# Patient Record
Sex: FEMALE | Race: WHITE
[De-identification: names, ages, dates, MRNs, and addresses within clinical notes are randomized per-mention and may not be internally consistent; named-entity substitution may affect disease eponyms.]

---

## 2017-02-01 NOTE — ED
Oralia ABDI Rebecca, scribed for Adam Wright MD on 17 at 0254 .





HPI Chest Pain





- HPI Summary


HPI Summary: 


Pt is a 37 y/o F who presents to ED c/o CP. Moody began suddenly 2 weeks ago and 

has been intermittent since onset, worsening today. Pain is midsternal without 

radiation, characterized as tightness and ranked 5/10. Sx aggravated and 

alleviated by nothing. Additionally c/o SOB, nonproductive cough, blurry vision 

and facial tingling. Has not been evaluated by her PCP for sx. 








- History of Current Complaint


Chief Complaint: EDGeneral


Hx Obtained From: Patient


Onset/Duration: Started Weeks Ago - 2 weeks ago, Still Present


Timing: Intermittent


Initial Severity: Moderate


Current Severity: Moderate


Pain Intensity: 5


Pain Scale Used: 0-10 Numeric


Chest Pain Location: Mid Sternal


Chest Pain Radiates: No


Character: Tightness


Aggravating Factor(s): Nothing


Alleviating Factor(s): Nothing


Associated Signs and Symptoms: Positive: Vision Changes - blurry, Tingling - 

facial, Shortness of Breath, Nonproductive Cough





- Allergy/Home Medications


Allergies/Adverse Reactions: 


 Allergies











Allergy/AdvReac Type Severity Reaction Status Date / Time


 


Amoxicillin Allergy Severe Anaphylatic Verified 16 13:07





   Shock  


 


Penicillins Allergy Severe Anaphylatic Verified 16 13:07





   Shock  














PMH/Surg Hx/FS Hx/Imm Hx


Endocrine/Hematology History: Reports: Hx Diabetes - TYPE 2


   Denies: Hx Thyroid Disease


Cardiovascular History: Reports: Hx Hypertension


Respiratory History: 


   Denies: Hx Asthma, Hx Chronic Obstructive Pulmonary Disease (COPD)


GI History: 


   Denies: Hx Ulcer





- Surgical History


Surgery Procedure, Year, and Place: .  TUBAL LIGATION


Infectious Disease History: No


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

, Hx Shingles


   Denies: Hx Clostridium Difficile, Hx Hepatitis, Hx Human Immunodeficiency 

Virus (HIV), Hx Tuberculosis, Hx Known/Suspected VRE, Hx Known/Suspected VRSA, 

History Other Infectious Disease, Traveled Outside the [place] in Last 30 Days





- Family History


Known Family History: Positive: Hypertension, Diabetes


   Negative: Blood Disorder





- Social History


Alcohol Use: Occasionally


Hx Substance Use: No


Substance Use Type: Reports: None


Substance Use Comment - Amount & Last Used: last used last night


Hx Tobacco Use: Yes


Smoking Status (MU): Light Every Day Tobacco Smoker


Type: Cigarettes


Amount Used/How Often: 5 CIG/DAY


Length of Time of Smoking/Using Tobacco: 20 years


Have You Smoked in the Last Year: Yes





Review of Systems


Positive: Blurred Vision


Positive: Chest Pain - midsternal


Positive: Shortness Of Breath, Cough - nonproductive


Neurological: Other - Facial tingling


All Other Systems Reviewed And Are Negative: Yes





Physical Exam


Triage Information Reviewed: Yes


Vital Signs On Initial Exam: 


 Initial Vitals











Temp Pulse Resp BP Pulse Ox


 


 97.2 F   90   16   176/93   100 


 


 17 02:30  17 02:30  17 02:30  17 02:30  17 02:30











Vital Signs Reviewed: Yes


Appearance: Positive: No Pain Distress, Obese


Skin: Positive: Warm


Eyes: Positive: FLORESITA


ENT: Positive: Hearing grossly normal


Neck: Positive: Supple


Respiratory/Lung Sounds: Positive: Clear to Auscultation, Breath Sounds Present


Cardiovascular: Positive: Normal


Abdomen Description: Positive: Nontender, No Organomegaly, Soft


Bowel Sounds: Positive: Present


Musculoskeletal: Positive: Strength/ROM Intact


Neurological: Positive: Sensory/Motor Intact


Psychiatric: Positive: Affect/Mood Appropriate





Diagnostics





- Vital Signs


 Vital Signs











  Temp Pulse Resp BP Pulse Ox


 


 17 02:48   93  15   100


 


 17 02:46     158/94 


 


 17 02:30  97.2 F  90  16  176/93  100














- Laboratory


Lab Results: 


 Lab Results











  17 Range/Units





  03:00 03:00 03:00 


 


WBC  10.8    (3.5-10.8)  10^3/ul


 


RBC  5.22    (4.0-5.4)  10^6/ul


 


Hgb  14.4    (12.0-16.0)  g/dl


 


Hct  44    (35-47)  %


 


MCV  84    (80-97)  fL


 


MCH  28    (27-31)  pg


 


MCHC  33    (31-36)  g/dl


 


RDW  15    (10.5-15)  %


 


Plt Count  234    (150-450)  10^3/ul


 


MPV  8    (7.4-10.4)  um3


 


Neut % (Auto)  60.2    (38-83)  %


 


Lymph % (Auto)  33.9    (25-47)  %


 


Mono % (Auto)  4.6    (1-9)  %


 


Eos % (Auto)  1.1    (0-6)  %


 


Baso % (Auto)  0.2    (0-2)  %


 


Absolute Neuts (auto)  6.5    (1.5-7.7)  10^3/ul


 


Absolute Lymphs (auto)  3.7    (1.0-4.8)  10^3/ul


 


Absolute Monos (auto)  0.5    (0-0.8)  10^3/ul


 


Absolute Eos (auto)  0.1    (0-0.6)  10^3/ul


 


Absolute Basos (auto)  0    (0-0.2)  10^3/ul


 


Absolute Nucleated RBC  0    10^3/ul


 


Nucleated RBC %  0    


 


Sodium   129 L   (133-145)  mmol/L


 


Potassium   TNP   


 


Chloride   99 L   (101-111)  mmol/L


 


Carbon Dioxide   22   (22-32)  mmol/L


 


Anion Gap   TNP   


 


BUN   12   (6-24)  mg/dL


 


Creatinine   0.90   (0.51-0.95)  mg/dL


 


Est GFR ( Amer)   91.6   (>60)  


 


Est GFR (Non-Af Amer)   71.3   (>60)  


 


BUN/Creatinine Ratio   13.3   (8-20)  


 


Glucose   349 H   ()  mg/dL


 


Lactic Acid    2.3 H*  (0.5-2.0)  mmol/L


 


Calcium   9.5   (8.6-10.3)  mg/dL


 


Magnesium   TNP   


 


Total Bilirubin   0.40   (0.2-1.0)  mg/dL


 


AST   TNP   


 


ALT   14   (7-52)  U/L


 


Alkaline Phosphatase   68   ()  U/L


 


Troponin I   0.02   (<0.04)  ng/mL


 


Total Protein   7.5   (6.4-8.9)  g/dL


 


Albumin   4.1   (3.2-5.2)  g/dL


 


Globulin   3.4   (2-4)  g/dL


 


Albumin/Globulin Ratio   1.2   (1-3)  


 


TSH   7.78 H   (0.34-5.60)  mcIU/mL


 


Influenza A (Rapid)     (Negative)  


 


Influenza B (Rapid)     (Negative)  














  17 Range/Units





  03:30 


 


WBC   (3.5-10.8)  10^3/ul


 


RBC   (4.0-5.4)  10^6/ul


 


Hgb   (12.0-16.0)  g/dl


 


Hct   (35-47)  %


 


MCV   (80-97)  fL


 


MCH   (27-31)  pg


 


MCHC   (31-36)  g/dl


 


RDW   (10.5-15)  %


 


Plt Count   (150-450)  10^3/ul


 


MPV   (7.4-10.4)  um3


 


Neut % (Auto)   (38-83)  %


 


Lymph % (Auto)   (25-47)  %


 


Mono % (Auto)   (1-9)  %


 


Eos % (Auto)   (0-6)  %


 


Baso % (Auto)   (0-2)  %


 


Absolute Neuts (auto)   (1.5-7.7)  10^3/ul


 


Absolute Lymphs (auto)   (1.0-4.8)  10^3/ul


 


Absolute Monos (auto)   (0-0.8)  10^3/ul


 


Absolute Eos (auto)   (0-0.6)  10^3/ul


 


Absolute Basos (auto)   (0-0.2)  10^3/ul


 


Absolute Nucleated RBC   10^3/ul


 


Nucleated RBC %   


 


Sodium   (133-145)  mmol/L


 


Potassium   


 


Chloride   (101-111)  mmol/L


 


Carbon Dioxide   (22-32)  mmol/L


 


Anion Gap   


 


BUN   (6-24)  mg/dL


 


Creatinine   (0.51-0.95)  mg/dL


 


Est GFR ( Amer)   (>60)  


 


Est GFR (Non-Af Amer)   (>60)  


 


BUN/Creatinine Ratio   (8-20)  


 


Glucose   ()  mg/dL


 


Lactic Acid   (0.5-2.0)  mmol/L


 


Calcium   (8.6-10.3)  mg/dL


 


Magnesium   


 


Total Bilirubin   (0.2-1.0)  mg/dL


 


AST   


 


ALT   (7-52)  U/L


 


Alkaline Phosphatase   ()  U/L


 


Troponin I   (<0.04)  ng/mL


 


Total Protein   (6.4-8.9)  g/dL


 


Albumin   (3.2-5.2)  g/dL


 


Globulin   (2-4)  g/dL


 


Albumin/Globulin Ratio   (1-3)  


 


TSH   (0.34-5.60)  mcIU/mL


 


Influenza A (Rapid)  Negative  (Negative)  


 


Influenza B (Rapid)  Negative  (Negative)  











Result Diagrams: 


 17 03:00





 17 04:15


Lab Statement: Any lab studies that have been ordered have been reviewed, and 

results considered in the medical decision making process.





- EKG


  ** 0234


Cardiac Rate: NL - 91 bpm


EKG Rhythm: Sinus Rhythm


EKG Interpretation: RBBB, no acute ischemia





Re-Evaluation





- Re-Evaluation


  ** First Eval


Re-Evaluation Time: 04:28


Change: Improved


Comment: Pt is feeling significantly better, will be d/c to home. She 

understands and agrees to plan.





Chest Pain Course/Dx





- Course


Assessment/Plan: Pt is a 37 y/o F who presents to ED with a CC of midsternal CP 

intermittently for the past 2 weeks. Additionally c/o blurred vision, SOB, 

facial tingling and nonproductive cough. Pt will be d/c to home with a dx of 

viral syndrome.





- Diagnoses


Provider Diagnoses: 


 Viral syndrome








Discharge





- Discharge Plan


Condition: Stable


Disposition: HOME


Patient Education Materials:  Viral Syndrome (ED)


Referrals: 


John MCKEON,Franklin NICHOLS [Primary Care Provider] - 3 Days (Follow up with your 

primary care physician in 3 days. )





The documentation as recorded by the Oralia adams Rebecca accurately 

reflects the service I personally performed and the decisions made by me, 

Adam Wright MD.

## 2017-08-15 NOTE — UC
Alicia ABDI SooYoung, scribed for Sandip Mejía MD on 08/15/17 at 1956 .





Abdominal Pain Female HPI





- HPI Summary


HPI Summary: 


[place] 36 y/o F [place] [facility] [place] stomach [place] for past 5 days. 

Associated sx: fever maxT of 102 F, chills, n/v/d, malaise, sinus pain onset 1 

day. Denies: melena. She states she's been having sulfur smelling burps. She 

has some Zofran at home which helped but relief didn't last very long. No one 

else at home is sick. Denies PMHx: colitis. Denies prev abd surgeries other 

than . Contacted her PCP, but wasn't able to see her. 














- History of Current Complaint


Chief Complaint: UCGeneralIllness


Stated Complaint: STOMACH ACHE


Time Seen by Provider: 08/15/17 19:53


Hx Obtained From: Patient


Hx Last Menstrual Period: 17


Onset/Duration: Gradual Onset, Lasting Days - onset 5 days ago, Still Present


Timing: Constant


Severity Currently: Moderate


Pain Intensity: 5


Pain Scale Used: 0-10 Numeric


Location: Other - umbilical


Character: Aching, Dull, Sharp


Alleviating Factor(s): Medications - Zofran


Associated Signs and Symptoms: Positive: Fever, Nausea, Vomiting, Diarrhea, 

Other: - pos: chills, malaise.  Negative: Blood in Stool


Allergies/Adverse Reactions: 


 Allergies











Allergy/AdvReac Type Severity Reaction Status Date / Time


 


Amoxicillin Allergy Severe Anaphylatic Verified 16 13:07





   Shock  


 


Penicillins Allergy Severe Anaphylatic Verified 16 13:07





   Shock  














PMH/Surg Hx/FS Hx/Imm Hx


Previously Healthy: No


Endocrine History: Diabetes


Cardiovascular History: Hypertension





- Surgical History


Surgical History: Yes


Surgery Procedure, Year, and Place: .  TUBAL LIGATION





- Family History


Known Family History: Positive: Hypertension, Diabetes


   Negative: Blood Disorder





- Social History


Occupation: Employed Full-time


Lives: With Family


Alcohol Use: Occasionally


Alcohol Amount: one can beer every other day


Substance Use Type: None


Substance Use Comment - Amount & Last Used: last used last night


Smoking Status (MU): Light Every Day Tobacco Smoker


Type: Cigarettes


Amount Used/How Often: 5 CIG/DAY


Length of Time of Smoking/Using Tobacco: 20 years


Have You Smoked in the Last Year: Yes


Household Exposure Type: Cigarettes





- Immunization History


Most Recent Influenza Vaccination: 


Most Recent Tetanus Shot: UTD


Most Recent Pneumonia Vaccination: none





Review of Systems


Constitutional: Fever, Chills, Fatigue - malaise


Skin: Negative


Eyes: Negative


ENT: Negative


Respiratory: Negative


Cardiovascular: Negative


Gastrointestinal: Abdominal Pain, Vomiting, Diarrhea, Nausea, Other - neg: 

melena


Genitourinary: Negative


Motor: Negative


Neurovascular: Negative


Musculoskeletal: Negative


Neurological: Negative


Psychological: Negative


All Other Systems Reviewed And Are Negative: Yes





Physical Exam


Triage Information Reviewed: Yes


Appearance: No Pain Distress, Ill-Appearing - mildly


Vital Signs: 


 Initial Vital Signs











Temp  97.5 F   08/15/17 18:48


 


Pulse  93   08/15/17 18:48


 


Resp  17   08/15/17 18:48


 


BP  141/87   08/15/17 18:48


 


Pulse Ox  100   08/15/17 18:48











Vital Signs Reviewed: Yes


Eyes: Positive: Other: - EOMI/FLORESITA


ENT: Positive: Hearing grossly normal


Neck: Positive: Supple, Nontender


Respiratory: Positive: Chest non-tender, Lungs clear, Normal breath sounds


Cardiovascular: Positive: RRR


Abdomen Description: Positive: Soft, Other: - minimal epigastric tenderness


Bowel Sounds: Positive: Present


Musculoskeletal: Positive: Strength Intact, ROM Intact


Neurological Exam: Normal - sensory/motor intact


Neurological: Positive: Alert - A&Ox3


Psychological: Positive: Age Appropriate Behavior


Skin Exam: Normal - warm, dry, color reflects adequate perfusion





Abd Pain Female Course/Dx





- Course


Course Of Treatment: Pt medications reviewed this visit.  Elevated BP but has 

current hypertension diagnosis.  DUE TO FEVER AND DURATION OF SX, WILL TREAT 

WITH CIPRO 500MG PO BID X 5 DAYS.  F/U PMD.  PATIENT WILL GO TO ED IF WORSE.





- Differential Dx/Diagnosis


Provider Diagnoses: DEHYDRATION. VOMITING AND DIARRHEA WITH FEVER X 5 DAYS.





Discharge





- Discharge Plan


Condition: Stable


Disposition: HOME


Prescriptions: 


Ciprofloxacin TAB* [Cipro 500 MG TAB*] 500 mg PO BID #10 tab


Ondansetron ODT TAB* [Zofran 4 MG Odt TAB*] 4 mg PO Q6H PRN #10 tab.odt


 PRN Reason: Nausea


Patient Education Materials:  Acute Nausea and Vomiting (ED), Acute Diarrhea (ED

), Fever in Adults (ED)


Forms:  *Work Release


Referrals: 


John MCKEON,Franklin NICHOLS [Primary Care Provider] - 


Additional Instructions: 


FOLLOW UP WITH YOUR DOCTOR.


GO TO THE EMERGENCY DEPARTMENT FOR ANY WORSENING OF YOUR CONDITION; PAIN, FEVER

, DEHYDRATION OR QUESTIONS OR CONCERNS.





The documentation as recorded by the Alicia adams SooYoung accurately 

reflects the service I personally performed and the decisions made by me, 

Sandip Mejía MD.

## 2017-08-19 NOTE — UC
Abdominal Pain Female HPI





- HPI Summary


HPI Summary: 





Nausea with loss of appetite starting 10 days ago, vomting multiple times per 

day starting 7 days ago. All recent vomiting has been bilious, denies blood in 

vomit or stool. No melena. Reports fever and chills without clear measurement 

of time or temperature. Seen here about 5 days ago, rx with cipro and zofran. 

Went to [facility] ED 2 days ago because of uncontrolled vomiting, contrast 

CT and abd US were negative. Returns here today because sx feel no better. 

Marked epigastric pain with bilious vomiting x 2 today.





- History of Current Complaint


Chief Complaint: UCGI


Stated Complaint: ABD PAIN


Time Seen by Provider: 17 12:40


Hx Obtained From: Patient


Hx Last Menstrual Period: 2717


Pregnant?: No


Onset/Duration: Gradual Onset, Lasting Weeks


Timing: Constant


Severity Initially: Mild


Severity Currently: Moderate


Location: Epigastric


Radiates: No


Character: Aching, Burning, Cramping, Sharp


Aggravating Factor(s): Food, Movement


Alleviating Factor(s): Nothing


Associated Signs and Symptoms: Positive: Fever, Nausea, Vomiting.  Negative: 

Blood in Stool, Urinary Symptoms


Allergies/Adverse Reactions: 


 Allergies











Allergy/AdvReac Type Severity Reaction Status Date / Time


 


Amoxicillin Allergy Severe Anaphylatic Verified 17 12:21





   Shock  


 


Penicillins Allergy Severe Anaphylatic Verified 17 12:21





   Shock  














PMH/Surg Hx/FS Hx/Imm Hx





- Additional Past Medical History


Additional PMH: 





Hx MRSA


Endocrine History: Diabetes





- Surgical History


Surgical History: Yes


Surgery Procedure, Year, and Place: .  TUBAL LIGATION





- Family History


Known Family History: Positive: Hypertension, Diabetes


   Negative: Blood Disorder





- Social History


Alcohol Use: Occasionally


Alcohol Amount: one can beer every other day


Substance Use Type: None


Substance Use Comment - Amount & Last Used: last used last night


Smoking Status (MU): Light Every Day Tobacco Smoker


Type: Cigarettes


Amount Used/How Often: 5 CIG/DAY


Length of Time of Smoking/Using Tobacco: 20 years


Have You Smoked in the Last Year: Yes


Household Exposure Type: Cigarettes





- Immunization History


Most Recent Influenza Vaccination: 


Most Recent Tetanus Shot: UTD


Most Recent Pneumonia Vaccination: none





Review of Systems


Constitutional: Fever, Chills, Fatigue


Skin: Negative


Eyes: Negative


ENT: Negative


Respiratory: Negative


Cardiovascular: Negative


Gastrointestinal: Abdominal Pain, Vomiting, Nausea


Genitourinary: Negative


Motor: Negative


Neurovascular: Negative


Musculoskeletal: Negative


Neurological: Negative


Psychological: Negative


All Other Systems Reviewed And Are Negative: Yes





Physical Exam


Triage Information Reviewed: Yes


Appearance: Obese


Vital Signs: 


 Initial Vital Signs











Temp  96.7 F   17 12:17


 


Pulse  96   17 12:17


 


Resp  18   17 12:17


 


BP  130/85   17 12:17


 


Pulse Ox  100   17 12:17











Vital Signs Reviewed: Yes


Eye Exam: Normal, Other - PERRL


Eyes: Positive: Conjunctiva Clear


ENT Exam: Normal


ENT: Positive: Normal ENT inspection, Hearing grossly normal, Pharynx normal, 

TMs normal


Dental: Negative: Percussion Tenderness @, Dental Fracture @


Neck exam: Normal


Respiratory Exam: Normal


Respiratory: Positive: Chest non-tender, Lungs clear, Normal breath sounds, No 

respiratory distress, No accessory muscle use


Cardiovascular: Positive: RRR - high 90s on exam


Abdominal Exam: Other - exam limited by obesity


Abdomen Description: Positive: No Organomegaly, Soft.  Negative: CVA Tenderness 

(R), CVA Tenderness (L)


Musculoskeletal Exam: Normal


Neurological Exam: Normal


Neurological: Positive: Alert


Psychological Exam: Normal


Skin Exam: Other - sweaty





Abd Pain Female Course/Dx





- Course


Course Of Treatment: blood glucose of 350 noted





- Differential Dx/Diagnosis


Provider Diagnoses: Abdominal pain.  bilious vomiting





- Physician Notification/Consults


Discussed Care of Patient With: Sandip Mejía


Time Discussed With Above Provider: 12:50





Discharge





- Discharge Plan


Condition: Stable


Disposition: OTHER


Discharge Disposition Comment: Going to [facility] [place] ED


Patient Education Materials:  Acute Abdominal Pain (ED)


Forms:  *Work Release


Referrals: 


John MCKEON,Franklin NICHOLS [Primary Care Provider] - 


Additional Instructions: 


Please go to the emergency department as we discussed for further testing.

## 2017-10-23 NOTE — UC
Aleksandar ABDI Angela, scribed for Petra Granado DO on 10/23/17 at 1016 .





Back Pain HPI





- HPI Summary


HPI Summary: 





This pt is a 38 y/o female presenting to [facility] c/o lower left sided back pain x3 

days. Pt additionally c/o lower abd pain. She states she has had pain like this 

before (bladder infection). She notes increased urinary frequency. Pt denies 

pain with urination, color change in urine, hematuria. She states some nausea 

but denies vomiting, fever, cough. Pt has a sore throat for 3 days. She states 

she currently has a yeast infection with white vaginal discharge. 


Pt has had 1 UTI in the past year. 


PMHx: type 2 DM. Pt states she is a current every day smoker (4 cigarettes/day) 

and is trying to cut down (before used to smoke 1 ppd). 


Allergies: amoxicillin, penicillin.





- History of Current Complaint


Chief Complaint: UCBackPain


Stated Complaint: LOWER BACK PAIN


Time Seen by Provider: 10/23/17 10:08


Hx Obtained From: Patient


Hx Last Menstrual Period: 17


Pregnant?: No


Onset/Duration: Lasting Days, Still Present


Timing: Lasting Days


Severity Initially: Moderate


Severity Currently: Moderate


Pain Intensity: 7


Pain Scale Used: 0-10 Numeric


Back Pain: Is Discrete @ - left sided back


Character: Dull, Aching


Aggravating Factor(s): Nothing


Alleviating Factor(s): Rest


Associated Signs And Symptoms: Positive: Abdominal Pain - lower, Other - 

increased urinary frequenct, nausea, yeast infection.  Negative: Fever, Weakness

, Numbness, Tingling





- Allergies/Home Medications


Allergies/Adverse Reactions: 


 Allergies











Allergy/AdvReac Type Severity Reaction Status Date / Time


 


Amoxicillin Allergy Severe Anaphylatic Verified 10/23/17 08:51





   Shock  


 


Penicillins Allergy Severe Anaphylatic Verified 10/23/17 08:51





   Shock  














PMH/Surg Hx/FS Hx/Imm Hx


Endocrine History: Diabetes - type 2


Cardiovascular History: Hypertension


Respiratory History: Asthma





- Surgical History


Surgical History: Yes


Surgery Procedure, Year, and Place: .  TUBAL LIGATION





- Family History


Known Family History: Positive: Cardiac Disease, Hypertension, Diabetes


   Negative: Blood Disorder





- Social History


Alcohol Use: Occasionally


Alcohol Amount: one can beer every other day


Substance Use Type: None


Substance Use Comment - Amount & Last Used: last used last night


Smoking Status (MU): Light Every Day Tobacco Smoker


Type: Cigarettes


Amount Used/How Often: 5 CIG/DAY


Length of Time of Smoking/Using Tobacco: 20 years


Have You Smoked in the Last Year: Yes


Household Exposure Type: Cigarettes


Cessation Counseling: Patient Advised to Stop





- Immunization History


Most Recent Influenza Vaccination: 


Most Recent Tetanus Shot: UTD


Most Recent Pneumonia Vaccination: none





Review of Systems


Constitutional: Negative


Skin: Negative


Eyes: Negative


ENT: Sore Throat


Respiratory: Negative


Cardiovascular: Negative


Gastrointestinal: Abdominal Pain - lower, Nausea, Other - NEG: vomiting


Genitourinary: Frequency, Vaginal/Penile Discharge - yeast infection, Other - 

NEG: dysuria, hematuria, color change in urine


Motor: Negative


Neurovascular: Negative


Musculoskeletal: Other: - left sided back pain


Neurological: Negative


Psychological: Negative


All Other Systems Reviewed And Are Negative: Yes





Physical Exam


Triage Information Reviewed: Yes


Appearance: Well-Appearing, No Pain Distress, Well-Nourished


Vital Signs: 


 Initial Vital Signs











Temp  97 F   10/23/17 08:52


 


Pulse  86   10/23/17 08:52


 


Resp  18   10/23/17 08:52


 


Pulse Ox  100   10/23/17 08:52











Vital Signs Reviewed: Yes


Eyes: Positive: Conjunctiva Clear.  Negative: Discharge


ENT: Positive: Hearing grossly normal, Pharyngeal erythema, TMs normal, Other: 

- nasal mucosa is pale and boggy..  Negative: Tonsillar swelling, Tonsillar 

exudate, Muffled/hoarse voice


Neck exam: Normal


Neck: Positive: Supple


Respiratory: Positive: Lungs clear, Normal breath sounds, No respiratory 

distress, No accessory muscle use


Cardiovascular: Positive: RRR, No Murmur


Abdomen Description: Positive: Soft, CVA Tenderness (R), CVA Tenderness (L), 

Other: - diffuse tenderness worst over suprapubic.  Negative: Distended, 

Guarding, McBurney's Point Tenderness


Musculoskeletal Exam: Normal


Neurological: Positive: Alert, Muscle Tone Normal


Psychological Exam: Normal


Psychological: Positive: Age Appropriate Behavior


Skin Exam: Normal


Skin: Positive: Other - warm, dry, normal color





Back Pain Course/Dx





- Course


Course Of Treatment: Medications reviewed this visit. The patient has been 

encouraged to quit smoking. High blood pressure noted, pt has PMHx of HTN. 

Urine dip shows 3+ glucose, blood, and leukocytes.





- Differential Dx/Diagnosis


Provider Diagnoses: UTI.  Elevated BP with diagnosis of HTN.





Discharge





- Discharge Plan


Condition: Stable


Disposition: HOME


Prescriptions: 


Fluconazole [Diflucan] 150 mg PO ONCE #2 tab


Nitrofurantoin Monohyd Macro [Macrobid] 100 mg PO BID #14 cap


Patient Education Materials:  Urinary Tract Infection in Women (ED), 

Vulvovaginal Candidiasis (ED)


Forms:  *Work Release


Referrals: 


John MCKEON,Franklin NICHOLS [Primary Care Provider] -  (FOLLOW UP IN 2 DAYS IF NOT 

IMPROVING.  OTHERWISE FOLLOW UP IN 2 WEEKS.)


Additional Instructions: 





Your blood pressure was elevated at this visit. Please follow up with your 

primary care provider.





NITROFURANTOIN:


     You have received a prescription for nitrofurantoin (Macrodantin). This 

antibiotic is used for urinary tract infections.


     Persons with G-6-PD (glucose 6-phosphate dehydrogenase) deficiency should 

not take this medication.  Women who are pregnant or nursing should notify the 

physician before taking this medicine.  If you have ever had a problem caused 

by this medication in the past, be sure the physician is aware of it.


     Common side effects of this medicine include nausea, vomiting, or 

decreased appetite.  Notify your physician if these side effects become severe.


     Immediately stop this medicine and call the physician if you develop cough

, shortness of breath, chest pain, weakness, jaundice (yellow color of the skin 

and whites of the eyes), or a skin rash.





ANYTIME YOU TAKE AN ANTIBIOTIC, IT IS IMPORTANT TO REPLENISH THE BODY'S SUPPLY 

OF "GOOD BACTERIA."  YOU CAN GET GOOD BACTERIA FROM HIGH QUALITY CULTURED FOODS 

SUCH AS LOCAL YOGURT, SOUR KRAUT, HEMALATHA TINA, NATURALLY FERMENTED PICKLES AND 

PROBIOTIC DRINKS.  YOU CAN ALSO GET GOOD BACTERIA FROM A PROBIOTIC SUPPLEMENT. 








FLUCONAZOLE:


     Fluconazole (Diflucan) is an antifungal drug.  It is useful for serious 

fungal infections, but is also excellent for oral or vaginal yeast infections.


     Diflucan interacts with some medicines.  This is a concern if you are 

taking anticoagulants (such as Coumadin), phenytoin (Dilantin), cyclosporin, or 

oral hypoglycemics (such as tolbutamide, Orinase, glipizide, Glucotrol, 

glyburide, DiaBeta, Glynase, and Micronase).  Be sure the doctor knows if you 

are taking one of these medicines.


     We don't know how Diflucan affects pregnancy.  If you are planning to 

become pregnant, discuss this with your doctor.


     Diflucan has few side effects.  Minor side effects may include nausea, 

headache, or diarrhea.  Call the doctor if you develop a skin rash, shortness 

of breath, or other new symptoms.





The documentation as recorded by the Aleksandar adams Angela accurately reflects 

the service I personally performed and the decisions made by me, Petra Granado DO.

## 2019-02-21 ENCOUNTER — HOSPITAL ENCOUNTER (EMERGENCY)
Dept: HOSPITAL 25 - UCEAST | Age: 39
Discharge: HOME | End: 2019-02-21
Payer: COMMERCIAL

## 2019-02-21 VITALS — DIASTOLIC BLOOD PRESSURE: 79 MMHG | SYSTOLIC BLOOD PRESSURE: 129 MMHG

## 2019-02-21 DIAGNOSIS — Z88.0: ICD-10-CM

## 2019-02-21 DIAGNOSIS — J45.909: ICD-10-CM

## 2019-02-21 DIAGNOSIS — J32.9: Primary | ICD-10-CM

## 2019-02-21 DIAGNOSIS — F17.210: ICD-10-CM

## 2019-02-21 LAB
FLUAV RNA SPEC QL NAA+PROBE: NEGATIVE
FLUBV RNA SPEC QL NAA+PROBE: NEGATIVE

## 2019-02-21 PROCEDURE — 99212 OFFICE O/P EST SF 10 MIN: CPT

## 2019-02-21 PROCEDURE — G0463 HOSPITAL OUTPT CLINIC VISIT: HCPCS

## 2019-02-21 NOTE — UC
Throat Pain/Nasal Hansel HPI





- HPI Summary


HPI Summary: 





39 yo female presents with sinus pain/pressure/congestion, feeling feverish, 

and b/l earache for the last 4-5 days. She has been taking ibuprofen and 

dayquill/nyquill with no relief. Has not taken her temperature. Denies cough, 

SOB, rash, n/v, or body aches. 











- History of Current Complaint


Chief Complaint: UCRespiratory


Stated Complaint: COUGH


Time Seen by Provider: 19 15:40


Hx Obtained From: Patient


Hx Last Menstrual Period: none


Onset/Duration: Sudden Onset


Severity: Severe


Pain Intensity: 8


Pain Scale Used: 0-10 Numeric





- Allergies/Home Medications


Allergies/Adverse Reactions: 


 Allergies











Allergy/AdvReac Type Severity Reaction Status Date / Time


 


Penicillins Allergy  Anaphylatic Verified 19 15:26





   Shock  











Home Medications: 


 Home Medications





Fluconazole [Diflucan] 150 mg PO ONCE PRN 19 [History Confirmed 19]











PMH/Surg Hx/FS Hx/Imm Hx


Respiratory History: Asthma


Psychological History: Anxiety





- Surgical History


Surgical History: Yes


Surgery Procedure, Year, and Place: .  TUBAL LIGATION.  Hysterectomy





- Family History


Known Family History: Positive: Cardiac Disease, Hypertension, Diabetes


   Negative: Blood Disorder





- Social History


Occupation: Employed Full-time


Lives: With Family


Alcohol Use: None


Alcohol Amount: one can beer every other day


Substance Use Type: None


Substance Use Comment - Amount & Last Used: last used last night


Smoking Status (MU): Light Every Day Tobacco Smoker


Type: Cigarettes


Amount Used/How Often: 5 CIG/DAY


Length of Time of Smoking/Using Tobacco: 20 years


Have You Smoked in the Last Year: Yes


Household Exposure Type: Cigarettes





- Immunization History


Most Recent Influenza Vaccination: 


Most Recent Tetanus Shot: UTD


Most Recent Pneumonia Vaccination: none





Review of Systems


All Other Systems Reviewed And Are Negative: Yes


Constitutional: Positive: Negative


Skin: Positive: Negative


Eyes: Positive: Negative


ENT: Positive: Ear Ache, Nasal Discharge, Sinus Congestion, Sinus Pain/

Tenderness


Respiratory: Positive: Negative


Cardiovascular: Positive: Negative


Gastrointestinal: Positive: Negative


Neurological: Positive: Negative


Psychological: Positive: Negative





Physical Exam





- Summary


Physical Exam Summary: 





GENERAL: NAD. WDWN. No pain distress.


SKIN: No rashes, sores, lesions, or open wounds.


HEENT:


            Head: AT/NC


            Eyes:  EOM intact. Conjunctiva clear without inflammation or 

discharge.


            Ears: Hearing grossly normal. TMs intact, no bulging, erythema, or 

edema. 


            Nose: Nasal mucosa mildly swollen and erythematous with yellow/

clear discharge. TTP maxillary > frontal sinus. Positive post nasal drip


            Throat: Posterior oropharynx without exudates, erythema, or 

tonsillar enlargement.  Uvula midline.


NECK: Supple. Nontender. No lymphadenopathy. 


CHEST:  CTAB. No r/r/w. No accessory muscle use. Breathing comfortably and in 

no distress.


CV:  RRR. Without m/r/g. Pulses intact. 


NEURO: Alert.


PSYCH: Age appropriate behavior.





Triage Information Reviewed: Yes


Vital Signs: 


 Initial Vital Signs











Temp  97.2 F   19 15:20


 


Pulse  113   19 15:20


 


Resp  20   19 15:20


 


BP  129/79   19 15:20


 


Pulse Ox  100   19 15:20








 Laboratory Tests











  19





  16:20


 


Influenza A (Rapid)  Negative


 


Influenza B (Rapid)  Negative











Vital Signs Reviewed: Yes





Throat Pain/Nasal Course/Dx





- Course


Course Of Treatment: Sinusitis





- Differential Dx/Diagnosis


Provider Diagnosis: 


 Sinusitis








Discharge





- Sign-Out/Discharge


Documenting (check all that apply): Patient Departure


All imaging exams completed and their final reports reviewed: No Studies





- Discharge Plan


Condition: Stable


Disposition: HOME


Prescriptions: 


Azithromycin TAB* [Zithromax TAB (Z-SENIA) 250 mg #6 tabs] 2 tab PO .TODAY, THEN 

1 DAILY #1 senia


Patient Education Materials:  Sinusitis (ED)


Referrals: 


John MCKEON,Franklin NICHOLS [Primary Care Provider] - 


Additional Instructions: 


If you develop a fever, shortness of breath, chest pain, new or worsening 

symptoms - please call your PCP or go to the ED.


 








- Billing Disposition and Condition


Condition: STABLE


Disposition: Home





- Attestation Statements


Provider Attestation: 





I was available for consult. This patient was seen by the FLAVIA. The patient was 

not presented to, seen by, or examined by me. -Tommy